# Patient Record
Sex: MALE
[De-identification: names, ages, dates, MRNs, and addresses within clinical notes are randomized per-mention and may not be internally consistent; named-entity substitution may affect disease eponyms.]

---

## 2019-04-05 PROBLEM — Z00.00 ENCOUNTER FOR PREVENTIVE HEALTH EXAMINATION: Status: ACTIVE | Noted: 2019-04-05

## 2019-04-19 ENCOUNTER — APPOINTMENT (OUTPATIENT)
Dept: ORTHOPEDIC SURGERY | Facility: CLINIC | Age: 54
End: 2019-04-19
Payer: MEDICAID

## 2019-04-19 VITALS
HEIGHT: 65 IN | DIASTOLIC BLOOD PRESSURE: 82 MMHG | SYSTOLIC BLOOD PRESSURE: 115 MMHG | WEIGHT: 145 LBS | HEART RATE: 92 BPM | BODY MASS INDEX: 24.16 KG/M2

## 2019-04-19 DIAGNOSIS — M54.16 RADICULOPATHY, LUMBAR REGION: ICD-10-CM

## 2019-04-19 DIAGNOSIS — M43.16 SPONDYLOLISTHESIS, LUMBAR REGION: ICD-10-CM

## 2019-04-19 PROCEDURE — 99203 OFFICE O/P NEW LOW 30 MIN: CPT

## 2019-04-19 PROCEDURE — 72110 X-RAY EXAM L-2 SPINE 4/>VWS: CPT

## 2021-04-08 ENCOUNTER — APPOINTMENT (OUTPATIENT)
Dept: UROLOGY | Facility: CLINIC | Age: 56
End: 2021-04-08
Payer: MEDICAID

## 2021-04-08 VITALS
WEIGHT: 147 LBS | HEART RATE: 79 BPM | SYSTOLIC BLOOD PRESSURE: 131 MMHG | TEMPERATURE: 98 F | DIASTOLIC BLOOD PRESSURE: 84 MMHG | RESPIRATION RATE: 17 BRPM | BODY MASS INDEX: 24.46 KG/M2

## 2021-04-08 PROCEDURE — 99244 OFF/OP CNSLTJ NEW/EST MOD 40: CPT

## 2021-04-08 NOTE — LETTER BODY
[FreeTextEntry1] : Dear Dr. Gallardo,\par \par Thank you for referring your patient Rodríguez Bob for consultation for erectile dysfunction and dorsal penile curvature.  I have requested several baseline blood studies. I will see the patient back in followup shortly and make further recommendations. I have attached a copy of my consultation note for your records.\par \par Thank you again for this kind referral. I will certainly keep you updated with further progress. Please do not hesitate to call me if you have any questions.\par \par Best regards,\par \par \par \par Uday Nash M.D., PhD\par Professor of Urology\par    [facility] [employer] [facility]\par  for Quality\par Director, Reproductive and Sexual Medicine\par    [facility]

## 2021-04-08 NOTE — PHYSICAL EXAM
[General Appearance - Well Developed] : well developed [General Appearance - Well Nourished] : well nourished [Normal Appearance] : normal appearance [Well Groomed] : well groomed [General Appearance - In No Acute Distress] : no acute distress [Heart Rate And Rhythm] : Heart rate and rhythm were normal [Arterial Pulses Normal] : the pedal pulses were normal [Edema] : no peripheral edema [Respiration, Rhythm And Depth] : normal respiratory rhythm and effort [Exaggerated Use Of Accessory Muscles For Inspiration] : no accessory muscle use [Auscultation Breath Sounds / Voice Sounds] : lungs were clear to auscultation bilaterally [Chest Palpation] : palpation of the chest revealed no abnormalities [Lungs Percussion] : the lungs were normal to percussion [Bowel Sounds] : normal bowel sounds [Abdomen Soft] : soft [Abdomen Tenderness] : non-tender [Abdomen Mass (___ Cm)] : no abdominal mass palpated [Abdomen Hernia] : no hernia was discovered [Costovertebral Angle Tenderness] : no ~M costovertebral angle tenderness [Urethral Meatus] : meatus normal [Urinary Bladder Findings] : the bladder was normal on palpation [Scrotum] : the scrotum was normal [Rectal Exam - Seminal Vesicles] : the seminal vesicles were normal [Epididymis] : the epididymides were normal [Testes Tenderness] : no tenderness of the testes [Testes Mass (___cm)] : there were no testicular masses [Anus Abnormality] : the anus and perineum were normal [Rectal Exam - Rectum] : digital rectal exam was normal [Prostate Enlargement] : the prostate was not enlarged [Prostate Tenderness] : the prostate was not tender [No Prostate Nodules] : no prostate nodules [Normal Station and Gait] : the gait and station were normal for the patient's age [Skin Color & Pigmentation] : normal skin color and pigmentation [Skin Turgor] : supple [] : no rash [Skin Lesions] : no skin lesions [No Focal Deficits] : no focal deficits [Sensation] : the sensory exam was normal to light touch and pinprick [Motor Exam] : the motor exam was normal [Oriented To Time, Place, And Person] : oriented to person, place, and time [Affect] : the affect was normal [Mood] : the mood was normal [Not Anxious] : not anxious [No Palpable Adenopathy] : no palpable adenopathy [Cervical Lymph Nodes Enlarged Posterior Bilaterally] : posterior cervical [Cervical Lymph Nodes Enlarged Anterior Bilaterally] : anterior cervical [Supraclavicular Lymph Nodes Enlarged Bilaterally] : supraclavicular [Axillary Lymph Nodes Enlarged Bilaterally] : axillary [Femoral Lymph Nodes Enlarged Bilaterally] : femoral [Inguinal Lymph Nodes Enlarged Bilaterally] : inguinal [Penis Abnormality] : normal uncircumcised penis [FreeTextEntry1] : Dorsal penile plaque
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
## 2021-04-08 NOTE — ASSESSMENT
[FreeTextEntry1] : This pleasant gentleman presents for evaluation of his sexual dysfunction and a dorsal penile plaque.  I have requested several blood studies.  Urine dip and microscopic analysis was requested.  I will make further recommendations when the results return. I have suggested a diagnostic injection and duplex ultrasound to evaluate his penile vasculature. We discussed his evaluation today and possible options for therapy depending upon the results of his testing.  I will be better able to make more specific recommendations after additional test results return. \par 1. Review blood tests on follow up\par 2. Penile duplex study on follow up\par \par Consultation: 40 minutes:  20 minutes reviewing his history and performing a physical examination.  20 minutes reviewing the ultrasound, writing for prescription medications and blood studies ,discussing treatment options and writing his note. There was also additional time in preparation for today's visit.\par

## 2021-04-13 LAB
BASOPHILS # BLD AUTO: 0.05 K/UL
BASOPHILS NFR BLD AUTO: 0.6 %
CHOLEST SERPL-MCNC: 145 MG/DL
EOSINOPHIL # BLD AUTO: 0.07 K/UL
EOSINOPHIL NFR BLD AUTO: 0.8 %
ESTIMATED AVERAGE GLUCOSE: 240 MG/DL
ESTRADIOL SERPL-MCNC: 22 PG/ML
HBA1C MFR BLD HPLC: 10 %
HCT VFR BLD CALC: 48 %
HDLC SERPL-MCNC: 30 MG/DL
HGB BLD-MCNC: 15.9 G/DL
IMM GRANULOCYTES NFR BLD AUTO: 0.2 %
LDLC SERPL CALC-MCNC: 52 MG/DL
LH SERPL-ACNC: 2.8 IU/L
LYMPHOCYTES # BLD AUTO: 2.81 K/UL
LYMPHOCYTES NFR BLD AUTO: 31.8 %
MAN DIFF?: NORMAL
MCHC RBC-ENTMCNC: 27.7 PG
MCHC RBC-ENTMCNC: 33.1 GM/DL
MCV RBC AUTO: 83.6 FL
MONOCYTES # BLD AUTO: 0.69 K/UL
MONOCYTES NFR BLD AUTO: 7.8 %
NEUTROPHILS # BLD AUTO: 5.2 K/UL
NEUTROPHILS NFR BLD AUTO: 58.8 %
NONHDLC SERPL-MCNC: 115 MG/DL
PLATELET # BLD AUTO: 241 K/UL
PROLACTIN SERPL-MCNC: 3.4 NG/ML
PSA SERPL-MCNC: 0.74 NG/ML
RBC # BLD: 5.74 M/UL
RBC # FLD: 13.5 %
TESTOST BND SERPL-MCNC: 3.9 PG/ML
TESTOST SERPL-MCNC: 176.9 NG/DL
TRIGL SERPL-MCNC: 311 MG/DL
TSH SERPL-ACNC: 0.75 UIU/ML
WBC # FLD AUTO: 8.84 K/UL

## 2021-06-03 ENCOUNTER — APPOINTMENT (OUTPATIENT)
Dept: UROLOGY | Facility: CLINIC | Age: 56
End: 2021-06-03
Payer: MEDICAID

## 2021-06-03 ENCOUNTER — OUTPATIENT (OUTPATIENT)
Dept: OUTPATIENT SERVICES | Facility: HOSPITAL | Age: 56
LOS: 1 days | End: 2021-06-03
Payer: MEDICAID

## 2021-06-03 DIAGNOSIS — N48.6 INDURATION PENIS PLASTICA: ICD-10-CM

## 2021-06-03 DIAGNOSIS — N52.9 MALE ERECTILE DYSFUNCTION, UNSPECIFIED: ICD-10-CM

## 2021-06-03 DIAGNOSIS — E34.9 ENDOCRINE DISORDER, UNSPECIFIED: ICD-10-CM

## 2021-06-03 DIAGNOSIS — R35.0 FREQUENCY OF MICTURITION: ICD-10-CM

## 2021-06-03 PROCEDURE — 99214 OFFICE O/P EST MOD 30 MIN: CPT | Mod: 25

## 2021-06-03 PROCEDURE — 54235 NJX CORPORA CAVERNOSA RX AGT: CPT

## 2021-06-03 PROCEDURE — 93980 PENILE VASCULAR STUDY: CPT

## 2021-06-03 PROCEDURE — 93980 PENILE VASCULAR STUDY: CPT | Mod: 26

## 2021-06-03 NOTE — PHYSICAL EXAM
[General Appearance - Well Developed] : well developed [General Appearance - Well Nourished] : well nourished [Normal Appearance] : normal appearance [Well Groomed] : well groomed [Abdomen Soft] : soft [General Appearance - In No Acute Distress] : no acute distress [Urethral Meatus] : meatus normal [Urinary Bladder Findings] : the bladder was normal on palpation [Scrotum] : the scrotum was normal [Testes Mass (___cm)] : there were no testicular masses [Skin Turgor] : supple [Affect] : the affect was normal [Oriented To Time, Place, And Person] : oriented to person, place, and time [Mood] : the mood was normal [Not Anxious] : not anxious [Normal Station and Gait] : the gait and station were normal for the patient's age

## 2021-06-03 NOTE — ASSESSMENT
[FreeTextEntry1] : The patient returns for follow-up to review his recent blood studies and to discuss options for therapy. Blood studies demonstrated hematocrit of 48%.  His LH was 2.8 IU/L.  His prolactin was low at 3.4 ng/mL.  TSH was normal.  His triglycerides were elevated at 311 mg/dL and medical evaluation was requested.  His hemoglobin A1c was extremely high at 10% and medical evaluation was again requested.  His PSA was normal at 0.74 ng/mL.  His free and total testosterone were low with a free testosterone of 3.9 pg/mL and a total testosterone 177 ng/dL repeat was requested.\par \par We will need to address his low testosterone with both repeat testosterone for confirmation and a DEXA scan.  He has symptoms of....... we discussed testosterone therapy but will wait for 2 confirming testosterone value prior to starting him on testosterone supplementation.  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  We discussed several options options for treatment of his penile curvature including surgical and injectable therapies.  We discussed at length the use of Xiaflex for treatment of his penile curvature.  We discussed realistic expectations.  He understands the possibility for penile edema, penile ecchymosis and penile fracture.  He has questions which were answered to his satisfaction.  He would like to proceed with Xiaflex therapy.\par \par \par Consultation: 30 minutes:  10 minutes reviewing his history and performing a physical examination.  20 minutes reviewing the ultrasound ,discussing treatment options and writing his note. There was also additional time in preparation for today's visit.\par
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
## 2021-07-06 ENCOUNTER — APPOINTMENT (OUTPATIENT)
Dept: UROLOGY | Facility: CLINIC | Age: 56
End: 2021-07-06
Payer: MEDICAID

## 2021-07-06 ENCOUNTER — OUTPATIENT (OUTPATIENT)
Dept: OUTPATIENT SERVICES | Facility: HOSPITAL | Age: 56
LOS: 1 days | End: 2021-07-06
Payer: MEDICAID

## 2021-07-06 VITALS — DIASTOLIC BLOOD PRESSURE: 88 MMHG | HEART RATE: 69 BPM | SYSTOLIC BLOOD PRESSURE: 148 MMHG

## 2021-07-06 DIAGNOSIS — N48.6 INDURATION PENIS PLASTICA: ICD-10-CM

## 2021-07-06 DIAGNOSIS — R35.0 FREQUENCY OF MICTURITION: ICD-10-CM

## 2021-07-06 PROCEDURE — 54200 INJECTION PX PEYRONIE DS: CPT

## 2021-07-06 RX ORDER — COLLAGENASE CLOSTRIDIUM HISTOLYTICUM 0.9 MG
0.9 KIT INJECTION
Qty: 0.58 | Refills: 0 | Status: COMPLETED | OUTPATIENT
Start: 2021-07-06 | End: 2021-07-06

## 2021-07-06 RX ORDER — COLLAGENASE CLOSTRIDIUM HISTOLYTICUM 0.9 MG
0.9 KIT INJECTION
Refills: 0 | Status: COMPLETED | OUTPATIENT
Start: 2021-07-06

## 2021-07-06 RX ADMIN — COLLAGENASE CLOSTRIDIUM HISTOLYTICUM 0 MG: KIT at 00:00

## 2021-07-08 ENCOUNTER — APPOINTMENT (OUTPATIENT)
Dept: UROLOGY | Facility: CLINIC | Age: 56
End: 2021-07-08
Payer: MEDICAID

## 2021-07-08 ENCOUNTER — OUTPATIENT (OUTPATIENT)
Dept: OUTPATIENT SERVICES | Facility: HOSPITAL | Age: 56
LOS: 1 days | End: 2021-07-08
Payer: MEDICAID

## 2021-07-08 VITALS
RESPIRATION RATE: 17 BRPM | DIASTOLIC BLOOD PRESSURE: 82 MMHG | SYSTOLIC BLOOD PRESSURE: 120 MMHG | TEMPERATURE: 98 F | HEART RATE: 71 BPM

## 2021-07-08 VITALS — DIASTOLIC BLOOD PRESSURE: 84 MMHG | HEART RATE: 71 BPM | SYSTOLIC BLOOD PRESSURE: 135 MMHG

## 2021-07-08 DIAGNOSIS — R35.0 FREQUENCY OF MICTURITION: ICD-10-CM

## 2021-07-08 DIAGNOSIS — N48.6 INDURATION PENIS PLASTICA: ICD-10-CM

## 2021-07-08 PROCEDURE — 54200 INJECTION PX PEYRONIE DS: CPT | Mod: 58

## 2021-07-08 PROCEDURE — 54200 INJECTION PX PEYRONIE DS: CPT

## 2021-07-08 RX ORDER — COLLAGENASE CLOSTRIDIUM HISTOLYTICUM 0.9 MG
0.9 KIT INJECTION
Qty: 0.58 | Refills: 0 | Status: COMPLETED | OUTPATIENT
Start: 2021-07-08 | End: 2021-07-08

## 2021-07-08 RX ORDER — COLLAGENASE CLOSTRIDIUM HISTOLYTICUM 0.9 MG
0.9 KIT INJECTION
Refills: 0 | Status: COMPLETED | OUTPATIENT
Start: 2021-07-08

## 2021-07-08 RX ADMIN — COLLAGENASE CLOSTRIDIUM HISTOLYTICUM 0 MG: KIT at 00:00

## 2021-08-02 ENCOUNTER — APPOINTMENT (OUTPATIENT)
Dept: UROLOGY | Facility: CLINIC | Age: 56
End: 2021-08-02
Payer: MEDICAID

## 2021-08-02 PROCEDURE — 99214 OFFICE O/P EST MOD 30 MIN: CPT | Mod: 95

## 2021-08-02 NOTE — ASSESSMENT
[FreeTextEntry1] : The patient returns for telehealth consultation to review his recent cycle of Xiaflex.  He continues to do his stretching exercises and he has no complaints.  He feels there might be some improvement in his curvature.  We again reviewed the proper way to do penile stretching.  I have written for the Trimix and my  will get back in touch with him to schedule the second cycle of Xiaflex.\par \par \par Telehealth Consultation: 30 minutes  10 minutes reviewing his history and discussing prior results.  20 minutes discussing various treatment options, writing medication prescription and writing his note. There was also additional time in preparing for the visit and assisting the patient with technology issues he was having with the telehealth platform.\par
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
## 2021-10-19 ENCOUNTER — APPOINTMENT (OUTPATIENT)
Dept: UROLOGY | Facility: CLINIC | Age: 56
End: 2021-10-19
Payer: MEDICARE

## 2021-10-19 PROCEDURE — 99214 OFFICE O/P EST MOD 30 MIN: CPT

## 2021-10-19 PROCEDURE — 99072 ADDL SUPL MATRL&STAF TM PHE: CPT
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
## 2021-10-19 NOTE — PHYSICAL EXAM
[General Appearance - Well Developed] : well developed [General Appearance - Well Nourished] : well nourished [Normal Appearance] : normal appearance [Well Groomed] : well groomed [General Appearance - In No Acute Distress] : no acute distress [Abdomen Soft] : soft [Urethral Meatus] : meatus normal [Urinary Bladder Findings] : the bladder was normal on palpation [Scrotum] : the scrotum was normal [Testes Mass (___cm)] : there were no testicular masses [No Prostate Nodules] : no prostate nodules [Skin Turgor] : supple [Oriented To Time, Place, And Person] : oriented to person, place, and time [Affect] : the affect was normal [Mood] : the mood was normal [Not Anxious] : not anxious [Normal Station and Gait] : the gait and station were normal for the patient's age

## 2021-10-19 NOTE — ASSESSMENT
[FreeTextEntry1] : The patient returns 3 months after his first cycle of Xiaflex.  He continues to do his stretching exercises and he has no complaints.  He feels there might be some improvement in his curvature.\par \par I have ordered trimix and will have him back for a penile duplex study.\par \par Consultation: 30 minutes:  10 minutes reviewing his history and performing a physical examination.  20 minutes discussing the next cycle of Xiaflexand degree of curvature and marking this area.,discussing treatment options and writing his note. There was also additional time in preparation for today's visit.

## 2021-11-02 ENCOUNTER — APPOINTMENT (OUTPATIENT)
Dept: UROLOGY | Facility: CLINIC | Age: 56
End: 2021-11-02
Payer: MEDICARE

## 2021-11-02 ENCOUNTER — OUTPATIENT (OUTPATIENT)
Dept: OUTPATIENT SERVICES | Facility: HOSPITAL | Age: 56
LOS: 1 days | End: 2021-11-02
Payer: COMMERCIAL

## 2021-11-02 DIAGNOSIS — N48.6 INDURATION PENIS PLASTICA: ICD-10-CM

## 2021-11-02 DIAGNOSIS — N52.9 MALE ERECTILE DYSFUNCTION, UNSPECIFIED: ICD-10-CM

## 2021-11-02 DIAGNOSIS — R35.0 FREQUENCY OF MICTURITION: ICD-10-CM

## 2021-11-02 PROCEDURE — 54235 NJX CORPORA CAVERNOSA RX AGT: CPT

## 2021-11-02 PROCEDURE — 99072 ADDL SUPL MATRL&STAF TM PHE: CPT

## 2021-11-02 PROCEDURE — 99214 OFFICE O/P EST MOD 30 MIN: CPT | Mod: 25

## 2021-11-02 PROCEDURE — 93980 PENILE VASCULAR STUDY: CPT | Mod: 26

## 2021-11-02 PROCEDURE — 93980 PENILE VASCULAR STUDY: CPT
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
## 2021-11-02 NOTE — ASSESSMENT
[FreeTextEntry1] : The patient returns 3 months after his first cycle of Xiaflex.  He continues to do his stretching exercises and he has no complaints.  He feels there might be some improvement in his curvature.  He has had some intermittent discomfort in his phallus and feels that his erections are poor.\par \par Penile duplex ultrasound demonstrated a dorsal curvature of 55 degrees located 1.5 cm from the glans.  He also had 60% tumescence and 40% rigidity.  He will return for a second cycle of Xiaflex.\par \par Consultation: 30 minutes:  10 minutes reviewing his history and performing a physical examination.  20 minutes reviewing the ultrasound, writing for prescription medications and blood studies ,discussing treatment options and writing his note. There was also additional time in preparation for today's visit.\par

## 2021-11-30 ENCOUNTER — APPOINTMENT (OUTPATIENT)
Dept: UROLOGY | Facility: CLINIC | Age: 56
End: 2021-11-30
Payer: MEDICARE

## 2021-11-30 ENCOUNTER — OUTPATIENT (OUTPATIENT)
Dept: OUTPATIENT SERVICES | Facility: HOSPITAL | Age: 56
LOS: 1 days | End: 2021-11-30
Payer: COMMERCIAL

## 2021-11-30 VITALS
RESPIRATION RATE: 17 BRPM | TEMPERATURE: 98.4 F | SYSTOLIC BLOOD PRESSURE: 124 MMHG | DIASTOLIC BLOOD PRESSURE: 81 MMHG | HEART RATE: 76 BPM

## 2021-11-30 VITALS
RESPIRATION RATE: 16 BRPM | SYSTOLIC BLOOD PRESSURE: 111 MMHG | HEART RATE: 84 BPM | TEMPERATURE: 98.4 F | DIASTOLIC BLOOD PRESSURE: 73 MMHG

## 2021-11-30 DIAGNOSIS — R35.0 FREQUENCY OF MICTURITION: ICD-10-CM

## 2021-11-30 PROCEDURE — 54200 INJECTION PX PEYRONIE DS: CPT

## 2021-11-30 RX ORDER — COLLAGENASE CLOSTRIDIUM HISTOLYTICUM 0.9 MG
0.9 KIT INJECTION
Qty: 0.58 | Refills: 0 | Status: COMPLETED | OUTPATIENT
Start: 2021-11-30 | End: 2021-11-30

## 2021-11-30 RX ORDER — COLLAGENASE CLOSTRIDIUM HISTOLYTICUM 0.9 MG
0.9 KIT INJECTION
Refills: 0 | Status: COMPLETED | OUTPATIENT
Start: 2021-11-30

## 2021-11-30 RX ADMIN — COLLAGENASE CLOSTRIDIUM HISTOLYTICUM 0 MG: KIT at 00:00

## 2021-12-01 DIAGNOSIS — N48.6 INDURATION PENIS PLASTICA: ICD-10-CM

## 2021-12-02 ENCOUNTER — APPOINTMENT (OUTPATIENT)
Dept: UROLOGY | Facility: CLINIC | Age: 56
End: 2021-12-02
Payer: MEDICARE

## 2021-12-02 ENCOUNTER — OUTPATIENT (OUTPATIENT)
Dept: OUTPATIENT SERVICES | Facility: HOSPITAL | Age: 56
LOS: 1 days | End: 2021-12-02
Payer: COMMERCIAL

## 2021-12-02 VITALS
RESPIRATION RATE: 16 BRPM | SYSTOLIC BLOOD PRESSURE: 121 MMHG | HEART RATE: 102 BPM | TEMPERATURE: 97.4 F | DIASTOLIC BLOOD PRESSURE: 78 MMHG

## 2021-12-02 DIAGNOSIS — N48.6 INDURATION PENIS PLASTICA: ICD-10-CM

## 2021-12-02 DIAGNOSIS — R35.0 FREQUENCY OF MICTURITION: ICD-10-CM

## 2021-12-02 PROCEDURE — 54200 INJECTION PX PEYRONIE DS: CPT | Mod: 58

## 2021-12-02 PROCEDURE — 54200 INJECTION PX PEYRONIE DS: CPT

## 2021-12-02 RX ORDER — COLLAGENASE CLOSTRIDIUM HISTOLYTICUM 0.9 MG
0.9 KIT INJECTION
Qty: 0.58 | Refills: 0 | Status: COMPLETED | OUTPATIENT
Start: 2021-12-02 | End: 2021-12-02

## 2021-12-02 RX ORDER — COLLAGENASE CLOSTRIDIUM HISTOLYTICUM 0.9 MG
0.9 KIT INJECTION
Refills: 0 | Status: COMPLETED | OUTPATIENT
Start: 2021-12-02

## 2021-12-02 RX ADMIN — COLLAGENASE CLOSTRIDIUM HISTOLYTICUM 0 MG: KIT at 00:00

## 2021-12-16 ENCOUNTER — APPOINTMENT (OUTPATIENT)
Dept: UROLOGY | Facility: CLINIC | Age: 56
End: 2021-12-16
Payer: MEDICARE

## 2021-12-16 PROCEDURE — 99214 OFFICE O/P EST MOD 30 MIN: CPT | Mod: 95
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
## 2021-12-16 NOTE — ASSESSMENT
[FreeTextEntry1] : The patient returns after his second cycle of Xiaflex.  He continues to do his stretching exercises and he has no complaints.  He did have a bump at the site of the injection which is dissipating.  He feels there might be some improvement in his curvature.\par \par I ordered his Trimix medication and have arranged to have him return in 4 to 6 weeks for his next cycle of Xiaflex.\par \par Consultation: 30 minutes:  10 minutes reviewing his history and performing a physical examination.  20 minutes reviewing the ultrasound, writing for prescription medications, discussing treatment options and writing his note. There was also additional time in preparation for today's visit.\par

## 2022-02-08 ENCOUNTER — OUTPATIENT (OUTPATIENT)
Dept: OUTPATIENT SERVICES | Facility: HOSPITAL | Age: 57
LOS: 1 days | End: 2022-02-08
Payer: COMMERCIAL

## 2022-02-08 ENCOUNTER — APPOINTMENT (OUTPATIENT)
Dept: UROLOGY | Facility: CLINIC | Age: 57
End: 2022-02-08
Payer: MEDICARE

## 2022-02-08 VITALS — DIASTOLIC BLOOD PRESSURE: 90 MMHG | HEART RATE: 73 BPM | SYSTOLIC BLOOD PRESSURE: 157 MMHG

## 2022-02-08 PROCEDURE — 54235 NJX CORPORA CAVERNOSA RX AGT: CPT

## 2022-02-08 PROCEDURE — 99214 OFFICE O/P EST MOD 30 MIN: CPT | Mod: 25

## 2022-02-08 PROCEDURE — 99072 ADDL SUPL MATRL&STAF TM PHE: CPT

## 2022-02-08 NOTE — ASSESSMENT
[FreeTextEntry1] : The patient returns after his second cycle of Xiaflex.  He continues to do his stretching exercises and he has no complaints.  He did have a bump at the site of the injection which is dissipating.  He feels there might be some improvement in his curvature.\par \par The patient presents for penile curvature assessment prior to his cycle of Xiaflex.. He is doing well and has no complaints.  He states that he continues to do the stretching exercises.\par \par He has had impaired erections that have been less than 5 out of 10 in both tumescence and rigidity.  He has not been responsive to PDE5 inhibitors.  An informed consent was obtained after a discussion of the procedures and relative risks and benefits.  In particular, he knows that an erection produced by this procedure should not last greater than 4 hours.  If it does irreparable damage to the erectile mechanism can occur. \par \par Flaccid length (cm): 10\par Stretched length (cm): 12 \par Stimulated Flaccid length (cm): 9\par  \par I injected 0.4 cc of the TriMix into the left corpora without difficulty. At 30 minutes he had 85% tumescence and 70% rigidity.  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 His erection was dissipated prior to leaving the office.\par \par The patient had 60 degrees of dorsal curvature located 2 cm from the glans. He will retirn for his next cycle of Xiaflex.\par \par Consultation: 30 minutes:  10 minutes reviewing his history and performing a physical examination.  20 minutes injecting the phallus with Trimix, waiting for a maximal erection, measuring the site of maximum curvature and degree of curvature and marking this area.,discussing treatment options and writing his note. There was also additional time in preparation for today's visit.\par \par Consultation: 30 minutes:  10 minutes reviewing his history and performing a physical examination.  20 minutes reviewing the ultrasound, writing for prescription medications, discussing treatment options and writing his note. There was also additional time in preparation for today's visit.\par

## 2022-02-08 NOTE — PHYSICAL EXAM
[General Appearance - Well Developed] : well developed [General Appearance - Well Nourished] : well nourished [Normal Appearance] : normal appearance [Well Groomed] : well groomed [General Appearance - In No Acute Distress] : no acute distress [Abdomen Soft] : soft [Urethral Meatus] : meatus normal [Urinary Bladder Findings] : the bladder was normal on palpation [Scrotum] : the scrotum was normal [Testes Mass (___cm)] : there were no testicular masses [Skin Turgor] : supple [Oriented To Time, Place, And Person] : oriented to person, place, and time [Affect] : the affect was normal [Mood] : the mood was normal [Not Anxious] : not anxious
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
## 2022-02-22 ENCOUNTER — OUTPATIENT (OUTPATIENT)
Dept: OUTPATIENT SERVICES | Facility: HOSPITAL | Age: 57
LOS: 1 days | End: 2022-02-22
Payer: COMMERCIAL

## 2022-02-22 ENCOUNTER — APPOINTMENT (OUTPATIENT)
Dept: UROLOGY | Facility: CLINIC | Age: 57
End: 2022-02-22
Payer: MEDICARE

## 2022-02-22 VITALS
DIASTOLIC BLOOD PRESSURE: 81 MMHG | TEMPERATURE: 98 F | RESPIRATION RATE: 16 BRPM | HEART RATE: 83 BPM | SYSTOLIC BLOOD PRESSURE: 131 MMHG

## 2022-02-22 VITALS — RESPIRATION RATE: 16 BRPM | HEART RATE: 80 BPM | SYSTOLIC BLOOD PRESSURE: 132 MMHG | DIASTOLIC BLOOD PRESSURE: 86 MMHG

## 2022-02-22 VITALS — HEART RATE: 80 BPM | DIASTOLIC BLOOD PRESSURE: 86 MMHG | SYSTOLIC BLOOD PRESSURE: 132 MMHG

## 2022-02-22 DIAGNOSIS — R35.0 FREQUENCY OF MICTURITION: ICD-10-CM

## 2022-02-22 DIAGNOSIS — N48.6 INDURATION PENIS PLASTICA: ICD-10-CM

## 2022-02-22 PROCEDURE — 54200 INJECTION PX PEYRONIE DS: CPT

## 2022-02-22 RX ORDER — COLLAGENASE CLOSTRIDIUM HISTOLYTICUM 0.9 MG
0.9 KIT INJECTION
Refills: 0 | Status: COMPLETED | OUTPATIENT
Start: 2022-02-22

## 2022-02-22 RX ORDER — COLLAGENASE CLOSTRIDIUM HISTOLYTICUM 0.9 MG
0.9 KIT INJECTION
Qty: 0.58 | Refills: 0 | Status: COMPLETED | OUTPATIENT
Start: 2022-02-22 | End: 2022-02-22

## 2022-02-22 RX ADMIN — COLLAGENASE CLOSTRIDIUM HISTOLYTICUM 0 MG: KIT at 00:00

## 2022-02-24 ENCOUNTER — OUTPATIENT (OUTPATIENT)
Dept: OUTPATIENT SERVICES | Facility: HOSPITAL | Age: 57
LOS: 1 days | End: 2022-02-24
Payer: COMMERCIAL

## 2022-02-24 ENCOUNTER — APPOINTMENT (OUTPATIENT)
Dept: UROLOGY | Facility: CLINIC | Age: 57
End: 2022-02-24
Payer: MEDICARE

## 2022-02-24 DIAGNOSIS — R35.0 FREQUENCY OF MICTURITION: ICD-10-CM

## 2022-02-24 DIAGNOSIS — N48.6 INDURATION PENIS PLASTICA: ICD-10-CM

## 2022-02-24 PROCEDURE — 54200 INJECTION PX PEYRONIE DS: CPT

## 2022-02-24 PROCEDURE — 54200 INJECTION PX PEYRONIE DS: CPT | Mod: 58

## 2022-02-24 RX ORDER — COLLAGENASE CLOSTRIDIUM HISTOLYTICUM 0.9 MG
0.9 KIT INJECTION
Qty: 0.58 | Refills: 0 | Status: COMPLETED | OUTPATIENT
Start: 2022-02-23 | End: 2022-02-24

## 2022-02-24 RX ORDER — COLLAGENASE CLOSTRIDIUM HISTOLYTICUM 0.9 MG
0.9 KIT INJECTION
Refills: 0 | Status: COMPLETED | OUTPATIENT
Start: 2022-02-24

## 2022-02-24 RX ADMIN — COLLAGENASE CLOSTRIDIUM HISTOLYTICUM 0 MG: KIT at 00:00

## 2022-03-10 DIAGNOSIS — N48.6 INDURATION PENIS PLASTICA: ICD-10-CM

## 2022-03-10 DIAGNOSIS — N52.9 MALE ERECTILE DYSFUNCTION, UNSPECIFIED: ICD-10-CM

## 2022-03-14 ENCOUNTER — APPOINTMENT (OUTPATIENT)
Dept: UROLOGY | Facility: CLINIC | Age: 57
End: 2022-03-14
Payer: MEDICARE

## 2022-03-15 ENCOUNTER — APPOINTMENT (OUTPATIENT)
Dept: UROLOGY | Facility: CLINIC | Age: 57
End: 2022-03-15
Payer: MEDICARE

## 2022-03-15 DIAGNOSIS — N52.9 MALE ERECTILE DYSFUNCTION, UNSPECIFIED: ICD-10-CM

## 2022-03-15 DIAGNOSIS — N48.6 INDURATION PENIS PLASTICA: ICD-10-CM

## 2022-03-15 DIAGNOSIS — E34.9 ENDOCRINE DISORDER, UNSPECIFIED: ICD-10-CM

## 2022-03-15 PROCEDURE — 99214 OFFICE O/P EST MOD 30 MIN: CPT | Mod: 95

## 2022-03-15 RX ORDER — PAPAVERINE HYDROCHLORIDE 30 MG/ML
30 INJECTION, SOLUTION INTRAVENOUS
Qty: 2 | Refills: 0 | Status: ACTIVE | COMMUNITY
Start: 2021-04-08 | End: 1900-01-01

## 2022-03-15 NOTE — ASSESSMENT
[FreeTextEntry1] : The patient returns after his third cycle of Xiaflex.  He continues to do his stretching exercises and he has no complaints.  He did have a bump at the site of the injection which is dissipating.  He feels there might be some improvement in his curvature.\par \par I ordered his Trimix medication and have arranged to have him return in 4 to 6 weeks for his next cycle of Xiaflex.\par \par Consultation: 30 minutes:  10 minutes reviewing his history and performing a physical examination.  20 minutes reviewing the ultrasound, writing for prescription medications, discussing treatment options and writing his note. There was also additional time in preparation for today's visit.\par
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
